# Patient Record
Sex: FEMALE | Race: WHITE | HISPANIC OR LATINO | Employment: OTHER | URBAN - METROPOLITAN AREA
[De-identification: names, ages, dates, MRNs, and addresses within clinical notes are randomized per-mention and may not be internally consistent; named-entity substitution may affect disease eponyms.]

---

## 2018-03-11 ENCOUNTER — HOSPITAL ENCOUNTER (EMERGENCY)
Facility: HOSPITAL | Age: 23
Discharge: HOME | End: 2018-03-11
Attending: EMERGENCY MEDICINE
Payer: COMMERCIAL

## 2018-03-11 VITALS
HEART RATE: 94 BPM | SYSTOLIC BLOOD PRESSURE: 98 MMHG | TEMPERATURE: 98 F | WEIGHT: 150 LBS | OXYGEN SATURATION: 98 % | BODY MASS INDEX: 28.32 KG/M2 | RESPIRATION RATE: 18 BRPM | DIASTOLIC BLOOD PRESSURE: 54 MMHG | HEIGHT: 61 IN

## 2018-03-11 DIAGNOSIS — M79.89 SOFT TISSUE MASS: Primary | ICD-10-CM

## 2018-03-11 LAB
B-HCG UR QL: NEGATIVE
CTP QC/QA: YES

## 2018-03-11 PROCEDURE — 81025 URINE PREGNANCY TEST: CPT | Performed by: PHYSICIAN ASSISTANT

## 2018-03-11 PROCEDURE — 99283 EMERGENCY DEPT VISIT LOW MDM: CPT | Mod: 25

## 2018-03-11 NOTE — ED PROVIDER NOTES
Encounter Date: 3/11/2018       History     Chief Complaint   Patient presents with    Cyst     Pt states she noticed a hardened area to her right upper lateral thigh x 2 months ago. Pt denies pain or any symptoms. Here today to find out what it is.      23-year-old female with a reported past medical history presents to the ED for evaluation of localized soft tissue swelling.  She states area is localized to the right lateral thigh and is present for several months.  She denies any trauma to the affected site.  She denies any change in size however just decided to have it checked out today.  She states that the area is nontender.  She denies any other associated symptoms with this.  She has not seen her doctor regarding this.      The history is provided by the patient.     Review of patient's allergies indicates:  No Known Allergies  History reviewed. No pertinent past medical history.  History reviewed. No pertinent surgical history.  History reviewed. No pertinent family history.  Social History   Substance Use Topics    Smoking status: Never Smoker    Smokeless tobacco: Never Used    Alcohol use No     Review of Systems   Constitutional: Negative for chills and fever.   Respiratory: Negative for shortness of breath.    Cardiovascular: Negative for chest pain.   Gastrointestinal: Negative for nausea.   Musculoskeletal: Negative for arthralgias, back pain and myalgias.        Soft tissue swelling of right lateral thigh   Skin: Negative for color change, rash and wound.   Neurological: Negative for weakness and numbness.   Hematological: Does not bruise/bleed easily.       Physical Exam     Initial Vitals [03/11/18 1146]   BP Pulse Resp Temp SpO2   (!) 98/54 94 18 97.7 °F (36.5 °C) 98 %      MAP       68.67         Physical Exam    Nursing note and vitals reviewed.  Constitutional: Vital signs are normal. She appears well-developed and well-nourished. She is cooperative.  Non-toxic appearance. She does not  appear ill. No distress.   HENT:   Head: Normocephalic and atraumatic.   Eyes: Conjunctivae and lids are normal.   Neck: Neck supple. No neck rigidity.   Cardiovascular: Normal rate.   Pulmonary/Chest: Breath sounds normal. No respiratory distress.   Abdominal: Soft. Normal appearance. There is no rigidity.   Musculoskeletal: Normal range of motion.        Right upper leg: She exhibits tenderness. She exhibits no bony tenderness and no deformity.        Legs:  Nontender soft tissue mass noted to the cervical region.  No erythema, fluctuance or induration.  No bony tenderness.  No skin abnormalities noted.   Neurological: She is alert and oriented to person, place, and time. GCS eye subscore is 4. GCS verbal subscore is 5. GCS motor subscore is 6.   Skin: Skin is warm, dry and intact. No rash noted. No erythema.   Psychiatric: She has a normal mood and affect. Her speech is normal and behavior is normal. Thought content normal.         ED Course   Procedures  Labs Reviewed   POCT URINE PREGNANCY             Medical Decision Making:   Initial Assessment:   Well-appearing female presents to the ED for evaluation of soft tissue swelling of the right lateral thigh times several months.  Physical exam reveals patient in no obvious distress. Nontender soft tissue mass noted to the cervical region.  No erythema, fluctuance or induration.  No bony tenderness.  No skin abnormalities noted.  No other significant abnormalities  Differential Diagnosis:   Lipoma, sebaceous cyst, hematoma  ED Management:  No workup indicates in the emergency room as low suspicion for acute emergent process in this well-appearing female with nontender soft tissue mass of the right lateral thigh times several months.  She was encouraged to follow up with PCP for further evaluation for possible referral to general surgery or dermatology. Patient was cautioned on when to return to ED. Patient verbalized understanding and agreement with the treatment  plan                        Clinical Impression:   The encounter diagnosis was Soft tissue mass.                           CRISTY Eddy  03/11/18 [id number]

## 2018-03-11 NOTE — ED NOTES
Pt reports firm, localized swelling to right, posterior, upper thigh just distal to gluteal fold x 2 mos.  Denies redness, pain, drainage or warmth to area.  Denies falling or trauma.

## 2018-03-12 ENCOUNTER — PES CALL (OUTPATIENT)
Dept: ADMINISTRATIVE | Facility: CLINIC | Age: 23
End: 2018-03-12